# Patient Record
Sex: MALE | Race: WHITE | NOT HISPANIC OR LATINO | Employment: FULL TIME | URBAN - METROPOLITAN AREA
[De-identification: names, ages, dates, MRNs, and addresses within clinical notes are randomized per-mention and may not be internally consistent; named-entity substitution may affect disease eponyms.]

---

## 2017-04-24 ENCOUNTER — NON-PROVIDER VISIT (OUTPATIENT)
Dept: URGENT CARE | Facility: PHYSICIAN GROUP | Age: 62
End: 2017-04-24

## 2017-04-24 DIAGNOSIS — Z02.1 DRUG TESTING, PRE-EMPLOYMENT: ICD-10-CM

## 2017-04-24 PROCEDURE — 8898 PR URINE 11 PANEL - SEND TO LAB: Performed by: PHYSICIAN ASSISTANT

## 2017-06-12 ENCOUNTER — OFFICE VISIT (OUTPATIENT)
Dept: URGENT CARE | Facility: CLINIC | Age: 62
End: 2017-06-12

## 2017-06-12 VITALS
HEART RATE: 82 BPM | SYSTOLIC BLOOD PRESSURE: 132 MMHG | RESPIRATION RATE: 14 BRPM | WEIGHT: 168 LBS | BODY MASS INDEX: 27.99 KG/M2 | DIASTOLIC BLOOD PRESSURE: 74 MMHG | OXYGEN SATURATION: 96 % | HEIGHT: 65 IN | TEMPERATURE: 97.7 F

## 2017-06-12 DIAGNOSIS — K52.9 AGE (ACUTE GASTROENTERITIS): ICD-10-CM

## 2017-06-12 DIAGNOSIS — R10.13 EPIGASTRIC PAIN: ICD-10-CM

## 2017-06-12 PROCEDURE — 99202 OFFICE O/P NEW SF 15 MIN: CPT | Performed by: NURSE PRACTITIONER

## 2017-06-12 ASSESSMENT — ENCOUNTER SYMPTOMS
NAUSEA: 1
MYALGIAS: 0
DIAPHORESIS: 1
ABDOMINAL PAIN: 1
FEVER: 0
CHILLS: 1
DIARRHEA: 1
DIZZINESS: 1
COUGH: 0
VOMITING: 0

## 2017-06-12 NOTE — MR AVS SNAPSHOT
"        Buzz Matt   2017 1:15 PM   Office Visit   MRN: [id number]    Department:  [facility]   Dept Phone:  [phone]    Description:  Male : 1955   Provider:  MANSI Tong           Reason for Visit     Dizziness pt states he started feeling dizzy with stomach pain, chills and feeling cold      Allergies as of 2017     No Known Allergies      You were diagnosed with     Epigastric pain   [283111]       AGE (acute gastroenteritis)   [320419]         Vital Signs     Blood Pressure Pulse Temperature Respirations Height Weight    132/74 mmHg 82 36.5 °C (97.7 °F) 14 1.651 m (5' 5\") 76.204 kg (168 lb)    Body Mass Index Oxygen Saturation Smoking Status             27.96 kg/m2 96% Never Smoker          Basic Information     Date Of Birth Sex Race Ethnicity Preferred Language    1955 Male White Non- English      Health Maintenance     Patient has no pending health maintenance at this time      Current Immunizations     No immunizations on file.      Below and/or attached are the medications your provider expects you to take. Review all of your home medications and newly ordered medications with your provider and/or pharmacist. Follow medication instructions as directed by your provider and/or pharmacist. Please keep your medication list with you and share with your provider. Update the information when medications are discontinued, doses are changed, or new medications (including over-the-counter products) are added; and carry medication information at all times in the event of emergency situations     Allergies:  No Known Allergies          Medications  Valid as of: 2017 -  2:33 PM    Generic Name Brand Name Tablet Size Instructions for use    .                 Medicines prescribed today were sent to:     [facility] - [place], [place] - [address]    [address] [address]    Phone: [phone] Fax: [phone]    Open 24 Hours?: No   "      Medication refill instructions:       If your prescription bottle indicates you have medication refills left, it is not necessary to call your provider’s office. Please contact your pharmacy and they will refill your medication.    If your prescription bottle indicates you do not have any refills left, you may request refills at any time through one of the following ways: The online [employer] system (except Urgent Care), by calling your provider’s office, or by asking your pharmacy to contact your provider’s office with a refill request. Medication refills are processed only during regular business hours and may not be available until the next business day. Your provider may request additional information or to have a follow-up visit with you prior to refilling your medication.   *Please Note: Medication refills are assigned a new Rx number when refilled electronically. Your pharmacy may indicate that no refills were authorized even though a new prescription for the same medication is available at the pharmacy. Please request the medicine by name with the pharmacy before contacting your provider for a refill.           [facility] Status: Patient Declined

## 2017-06-12 NOTE — PROGRESS NOTES
"Subjective:      Buzz Matt is a 62 y.o. male who presents with Dizziness            Dizziness  This is a new problem. The current episode started today. The problem occurs constantly. The problem has been unchanged. Associated symptoms include abdominal pain, chills, diaphoresis and nausea. Pertinent negatives include no chest pain, coughing, fever, myalgias or vomiting. He has tried nothing for the symptoms.   earlier pain level was 8/10, central. Still has appendix. Denies constipation but has had some diarrhea.  Pain was sharp, stabbing.    Review of Systems   Constitutional: Positive for chills and diaphoresis. Negative for fever.   Respiratory: Negative for cough.    Cardiovascular: Negative for chest pain.   Gastrointestinal: Positive for nausea, abdominal pain and diarrhea. Negative for vomiting.   Musculoskeletal: Negative for myalgias.   Neurological: Positive for dizziness.          Objective:     /74 mmHg  Pulse 82  Temp(Src) 36.5 °C (97.7 °F)  Resp 14  Ht 1.651 m (5' 5\")  Wt 76.204 kg (168 lb)  BMI 27.96 kg/m2  SpO2 96%     Physical Exam   Constitutional: He is oriented to person, place, and time. He appears well-developed and well-nourished. No distress.   Cardiovascular: Normal rate, regular rhythm and normal heart sounds.    No murmur heard.  Pulmonary/Chest: Effort normal and breath sounds normal.   Abdominal: Soft. Bowel sounds are increased. There is no tenderness.   Musculoskeletal: Normal range of motion.   Neurological: He is alert and oriented to person, place, and time.   Skin: Skin is warm and dry.   Nursing note and vitals reviewed.              Assessment/Plan:     1. Epigastric pain  EKG - Clinic Performed   2. AGE (acute gastroenteritis)       EKG, NSR with rate of 74; poor R wave progressions and non specific anterolateral T wave abnormalities.  Likely viral but strict ED precautions given for worsening of symptoms.        "

## 2017-06-18 ENCOUNTER — OFFICE VISIT (OUTPATIENT)
Dept: URGENT CARE | Facility: PHYSICIAN GROUP | Age: 62
End: 2017-06-18
Payer: COMMERCIAL

## 2017-06-18 VITALS
DIASTOLIC BLOOD PRESSURE: 92 MMHG | OXYGEN SATURATION: 96 % | TEMPERATURE: 99.3 F | HEART RATE: 89 BPM | SYSTOLIC BLOOD PRESSURE: 144 MMHG | WEIGHT: 168 LBS | RESPIRATION RATE: 16 BRPM | BODY MASS INDEX: 27.96 KG/M2

## 2017-06-18 DIAGNOSIS — J22 ACUTE LOWER RESPIRATORY TRACT INFECTION: ICD-10-CM

## 2017-06-18 DIAGNOSIS — R05.8 PRODUCTIVE COUGH: ICD-10-CM

## 2017-06-18 PROCEDURE — 99214 OFFICE O/P EST MOD 30 MIN: CPT | Performed by: NURSE PRACTITIONER

## 2017-06-18 RX ORDER — BENZONATATE 100 MG/1
100 CAPSULE ORAL 3 TIMES DAILY PRN
Qty: 60 CAP | Refills: 0 | Status: SHIPPED | OUTPATIENT
Start: 2017-06-18 | End: 2018-03-16

## 2017-06-18 RX ORDER — CODEINE PHOSPHATE AND GUAIFENESIN 10; 100 MG/5ML; MG/5ML
5 SOLUTION ORAL
Qty: 120 ML | Refills: 0 | Status: SHIPPED | OUTPATIENT
Start: 2017-06-18 | End: 2017-06-18 | Stop reason: SDUPTHER

## 2017-06-18 RX ORDER — DOXYCYCLINE HYCLATE 100 MG
100 TABLET ORAL 2 TIMES DAILY
Qty: 14 TAB | Refills: 0 | Status: SHIPPED | OUTPATIENT
Start: 2017-06-18 | End: 2017-06-25

## 2017-06-18 RX ORDER — CODEINE PHOSPHATE AND GUAIFENESIN 10; 100 MG/5ML; MG/5ML
5 SOLUTION ORAL
Qty: 120 ML | Refills: 0 | Status: SHIPPED | OUTPATIENT
Start: 2017-06-18 | End: 2018-03-16

## 2017-06-19 NOTE — PROGRESS NOTES
Chief Complaint   Patient presents with   • URI     cough, body aches , green yellow phlegm       HISTORY OF PRESENT ILLNESS: Patient is a 62 y.o. male who presents today due to symptoms that started one month ago. Pt reports a productive cough without blood in sputum. Reports associated sore throat, right ear pain, nasal congestion, sinus pressure, fever, wheezing, and body aches. Denies chest pain, shortness of breath. Denies h/o asthma/copd/CAP. No immunocompromise. Has tried OTC cold medications without significant relief of symptoms. No recent ABX use. No other aggravating or alleviating factors.     There are no active problems to display for this patient.      Allergies:Review of patient's allergies indicates no known allergies.    No current [facility]-ordered outpatient prescriptions on file.     No current [facility]-ordered facility-administered medications on file.       History reviewed. No pertinent past medical history.    Social History   Substance Use Topics   • Smoking status: Never Smoker    • Smokeless tobacco: None   • Alcohol Use: None       No family status information on file.   History reviewed. No pertinent family history.    ROS:  Review of Systems   Constitutional: Positive for subjective fever, chills, fatigue. Negative for weight loss and malaise.  HENT: Positive for ear pain, congestion and sore throat. Negative for nosebleeds, and neck pain.    Eyes: Negative for vision changes.   Cardiovascular: Negative for chest pain, palpitations, orthopnea and leg swelling.   Respiratory: Positive for cough and sputum production. Negative for shortness of breath and wheezing.   Gastrointestinal: Negative for abdominal pain, nausea, vomiting or diarrhea.   Skin: Negative for rash, diaphoresis.     Exam:  Blood pressure 144/92, pulse 89, temperature 37.4 °C (99.3 °F), resp. rate 16, weight 76.204 kg (168 lb), SpO2 96 %.  General: well-nourished, well-developed male in NAD  Head: normocephalic,  atraumatic  Eyes: PERRLA, EOM within normal limits, no conjunctival injection, no scleral icterus, visual fields and acuity grossly intact.  Ears: normal shape and symmetry, no tenderness, no discharge. External canals are without any significant edema or erythema. Tympanic membranes are without any inflammation, no effusion. Gross auditory acuity is intact.  Nose: symmetrical without tenderness, mild discharge, erythema present bilateral nares.  Mouth/Throat: reasonable hygiene, no exudates or tonsillar enlargement. Erythema present.   Neck: no masses, range of motion within normal limits, no tracheal deviation.  Lymph: mild cervical adenopathy. No supraclavicular adenopathy.   Neuro: alert and oriented. Cranial nerves 1-12 grossly intact.   Cardiovascular: regular rate and rhythm without murmurs, rubs, or gallops. No edema.   Pulmonary: no distress. Chest is symmetrical with respiration, no wheezes, crackles, or rhonchi.   Musculoskeletal: appropriate muscle tone, gait is stable.  Skin: warm, dry, intact, no clubbing, no cyanosis.   Psych: appropriate mood, affect, judgement.         Assessment/Plan:  1. Acute lower respiratory tract infection  doxycycline (VIBRAMYCIN) 100 MG Tab   2. Productive cough  benzonatate (TESSALON) 100 MG Cap    guaifenesin-codeine (ROBITUSSIN AC) Solution oral solution    DISCONTINUED: guaifenesin-codeine (ROBITUSSIN AC) Solution oral solution           Doxy as directed. Tessalon and Robitussin AC for cough, sedative effects of medication discussed with patient.   Rest, increase fluids, hand and respiratory hygiene. May take OTC medications as directed for symptom relief. Honey for cough.   Supportive care, differential diagnoses, and indications for immediate follow-up discussed with patient.   Pathogenesis of diagnosis discussed including typical length and natural progression.  Instructed to return to clinic or nearest emergency department for any change in condition, further  concerns, or worsening of symptoms.  Patient states understanding of the plan of care and discharge instructions.  Instructed to make an appointment with their primary care provider in the next 3-7 days if not significantly improving and for further care.         Please note that this dictation was created using voice recognition software. I have made every reasonable attempt to correct obvious errors, but I expect that there are errors of grammar and possibly content that I did not discover before finalizing the note.      ABHIJEET Moseley.

## 2017-07-29 ENCOUNTER — OFFICE VISIT (OUTPATIENT)
Dept: URGENT CARE | Facility: PHYSICIAN GROUP | Age: 62
End: 2017-07-29
Payer: COMMERCIAL

## 2017-07-29 VITALS
DIASTOLIC BLOOD PRESSURE: 78 MMHG | TEMPERATURE: 97.7 F | HEART RATE: 56 BPM | WEIGHT: 165 LBS | OXYGEN SATURATION: 97 % | HEIGHT: 69 IN | BODY MASS INDEX: 24.44 KG/M2 | RESPIRATION RATE: 16 BRPM | SYSTOLIC BLOOD PRESSURE: 112 MMHG

## 2017-07-29 DIAGNOSIS — H05.012 ORBITAL CELLULITIS ON LEFT: ICD-10-CM

## 2017-07-29 DIAGNOSIS — H10.32 ACUTE BACTERIAL CONJUNCTIVITIS OF LEFT EYE: ICD-10-CM

## 2017-07-29 PROCEDURE — 99214 OFFICE O/P EST MOD 30 MIN: CPT | Performed by: NURSE PRACTITIONER

## 2017-07-29 RX ORDER — AMOXICILLIN AND CLAVULANATE POTASSIUM 875; 125 MG/1; MG/1
1 TABLET, FILM COATED ORAL 2 TIMES DAILY
Qty: 14 TAB | Refills: 0 | Status: SHIPPED | OUTPATIENT
Start: 2017-07-29 | End: 2017-08-05

## 2017-07-29 RX ORDER — TOBRAMYCIN 3 MG/ML
1 SOLUTION/ DROPS OPHTHALMIC 4 TIMES DAILY
Qty: 1 BOTTLE | Refills: 0 | Status: SHIPPED | OUTPATIENT
Start: 2017-07-29 | End: 2018-03-16

## 2017-07-29 ASSESSMENT — ENCOUNTER SYMPTOMS
NECK PAIN: 0
BLURRED VISION: 0
COUGH: 0
EYE REDNESS: 1
PHOTOPHOBIA: 0
SORE THROAT: 0
EYE DISCHARGE: 1
DOUBLE VISION: 0
CHILLS: 0
FEVER: 0
WEAKNESS: 0
TINGLING: 0
SENSORY CHANGE: 0
DIZZINESS: 0
MYALGIAS: 0
HEADACHES: 0
EYE PAIN: 0

## 2017-07-29 ASSESSMENT — VISUAL ACUITY
OD_CC: 20/40
OS_CC: 20/50

## 2017-07-29 NOTE — PROGRESS NOTES
"Subjective:      Buzz Denis is a 62 y.o. male who presents with Eye Problem            Eye Problem   Associated symptoms include an eye discharge and eye redness. Pertinent negatives include no blurred vision, double vision, fever, itching, photophobia, tingling or weakness.   Buzz is a 62 year old male who is here for left eye swelling started today. States woke up with left eye swollen shut with white d/c. States wife washed his eye out and took what looked like an \"eyelash\". Denies eye pain, vision change, wears prescription glasses, no contact lens. Denies nasal congestion, sore throat, ear/facial pressure, no h/o seasonal allergies. States had gotten \"2 small bals of nasal mucus out of my left nose this morning\".     PMH:  has a past medical history of Hypertension and Brain tumor (benign) (CMS-HCC).  MEDS:   Current outpatient prescriptions:   •  tobramycin (TOBREX) 0.3 % Solution ophthalmic solution, Place 1 Drop in left eye 4 times a day., Disp: 1 Bottle, Rfl: 0  •  amoxicillin-clavulanate (AUGMENTIN) 875-125 MG Tab, Take 1 Tab by mouth 2 times a day for 7 days., Disp: 14 Tab, Rfl: 0  •  LISINOPRIL PO, Take  by mouth., Disp: , Rfl:   •  Nebivolol HCl (BYSTOLIC PO), Take  by mouth., Disp: , Rfl:   •  benzonatate (TESSALON) 100 MG Cap, Take 1 Cap by mouth 3 times a day as needed for Cough., Disp: 60 Cap, Rfl: 0  •  guaifenesin-codeine (ROBITUSSIN AC) Solution oral solution, Take 5 mL by mouth at bedtime as needed., Disp: 120 mL, Rfl: 0  ALLERGIES: No Known Allergies  SURGHX:   Past Surgical History   Procedure Laterality Date   • Craniotomy       SOCHX:  reports that he has never smoked. He has never used smokeless tobacco. He reports that he does not drink alcohol or use illicit drugs.  FH: Family history was reviewed, no pertinent findings to report      Review of Systems   Constitutional: Negative for fever, chills and malaise/fatigue.   HENT: Negative for congestion, ear pain and sore throat. " "   Eyes: Positive for discharge and redness. Negative for blurred vision, double vision, photophobia and pain.   Respiratory: Negative for cough.    Musculoskeletal: Negative for myalgias and neck pain.   Skin: Negative for itching and rash.   Neurological: Negative for dizziness, tingling, sensory change, weakness and headaches.   Endo/Heme/Allergies: Negative for environmental allergies.   All other systems reviewed and are negative.         Objective:     /78 mmHg  Pulse 56  Temp(Src) 36.5 °C (97.7 °F)  Resp 16  Ht 1.753 m (5' 9.02\")  Wt 74.844 kg (165 lb)  BMI 24.36 kg/m2  SpO2 97%     Physical Exam   Constitutional: He is oriented to person, place, and time. Vital signs are normal. He appears well-developed and well-nourished. He is active and cooperative.  Non-toxic appearance. He does not have a sickly appearance. He does not appear ill. No distress.   HENT:   Head: Normocephalic.   Right Ear: Hearing, tympanic membrane, external ear and ear canal normal.   Left Ear: Hearing, tympanic membrane, external ear and ear canal normal.   Nose: Nose normal. No mucosal edema, rhinorrhea or sinus tenderness.   Mouth/Throat: Uvula is midline, oropharynx is clear and moist and mucous membranes are normal. No uvula swelling.   bilat hearing aids   Eyes: EOM are normal. Pupils are equal, round, and reactive to light. Right eye exhibits no chemosis, no discharge, no exudate and no hordeolum. No foreign body present in the right eye. Left eye exhibits chemosis and discharge. Left eye exhibits no exudate and no hordeolum. No foreign body present in the left eye. Right conjunctiva is not injected. Right conjunctiva has no hemorrhage. Left conjunctiva is injected. Left conjunctiva has no hemorrhage. No scleral icterus.       Periorbital swelling or left eye with redness of conjunctiva of upper and lower eyelids, white thick eye d/c under upper eyelid. Procedure:   Eye stain. Applied one drop Proparacaine to right " eye. Apllied Fluorescein stain to left eye. No uptake seen with lamp. No foreign body seen in left eye.          Neck: Normal range of motion. Neck supple.   Cardiovascular: Normal rate.    Pulmonary/Chest: Effort normal.   Musculoskeletal: Normal range of motion.   Neurological: He is alert and oriented to person, place, and time.   Skin: Skin is warm and dry. He is not diaphoretic.   Vitals reviewed.              Assessment/Plan:     1. Acute bacterial conjunctivitis of left eye  *  - tobramycin (TOBREX) 0.3 % Solution ophthalmic solution; Place 1 Drop in left eye 4 times a day.  Dispense: 1 Bottle; Refill: 0    2. Orbital cellulitis on left    - amoxicillin-clavulanate (AUGMENTIN) 875-125 MG Tab; Take 1 Tab by mouth 2 times a day for 7 days.  Dispense: 14 Tab; Refill: 0    May use longer acting antihistamine like Claritin prn for any seasonal allergy symptoms  May use cool compresses for eye swelling  Avoid touching eyes  May clean eyes with mild dilute soap along eyelash line with eyes closed, rinse with plenty of water  Monitor for increase in redness or swelling, vision change, eye pain in next 2 days- need re-evaluation

## 2017-07-29 NOTE — MR AVS SNAPSHOT
"        Buzz Denis   2017 2:10 PM   Office Visit   MRN: [id number]    Department:  [facility]   Dept Phone:  [phone]    Description:  Male : 1955   Provider:  ANDREAS Spencer           Reason for Visit     Eye Problem           Allergies as of 2017     No Known Allergies      You were diagnosed with     Acute bacterial conjunctivitis of left eye   [3109099]       Orbital cellulitis on left   [[id number]]         Vital Signs     Blood Pressure Pulse Temperature Respirations Height Weight    112/78 mmHg 56 36.5 °C (97.7 °F) 16 1.753 m (5' 9.02\") 74.844 kg (165 lb)    Body Mass Index Oxygen Saturation Smoking Status             24.36 kg/m2 97% Never Smoker          Basic Information     Date Of Birth Sex Race Ethnicity Preferred Language    1955 Male White Non- English      Health Maintenance        Date Due Completion Dates    IMM DTaP/Tdap/Td Vaccine (1 - Tdap) 1972 ---    COLONOSCOPY 2003 ---    IMM ZOSTER VACCINE 2013 ---    IMM INFLUENZA (1) 2017 ---            Current Immunizations     No immunizations on file.      Below and/or attached are the medications your provider expects you to take. Review all of your home medications and newly ordered medications with your provider and/or pharmacist. Follow medication instructions as directed by your provider and/or pharmacist. Please keep your medication list with you and share with your provider. Update the information when medications are discontinued, doses are changed, or new medications (including over-the-counter products) are added; and carry medication information at all times in the event of emergency situations     Allergies:  No Known Allergies          Medications  Valid as of: 2017 -  4:34 PM    Generic Name Brand Name Tablet Size Instructions for use    Amoxicillin-Pot Clavulanate (Tab) AUGMENTIN 875-125 MG Take 1 Tab by mouth 2 times a day for 7 days.        Benzonatate " (Cap) TESSALON 100 MG Take 1 Cap by mouth 3 times a day as needed for Cough.        Guaifenesin-Codeine (Solution) ROBITUSSIN -10 mg/5mL Take 5 mL by mouth at bedtime as needed.        Lisinopril   Take  by mouth.        Nebivolol HCl   Take  by mouth.        Tobramycin (Solution) TOBREX 0.3 % Place 1 Drop in left eye 4 times a day.        .                 Medicines prescribed today were sent to:     [facility], [place] - [address] [facility].    [address] [place] [address]    Phone: [phone] Fax: [phone]    Open 24 Hours?: No      Medication refill instructions:       If your prescription bottle indicates you have medication refills left, it is not necessary to call your provider’s office. Please contact your pharmacy and they will refill your medication.    If your prescription bottle indicates you do not have any refills left, you may request refills at any time through one of the following ways: The online [employer] system (except Urgent Care), by calling your provider’s office, or by asking your pharmacy to contact your provider’s office with a refill request. Medication refills are processed only during regular business hours and may not be available until the next business day. Your provider may request additional information or to have a follow-up visit with you prior to refilling your medication.   *Please Note: Medication refills are assigned a new Rx number when refilled electronically. Your pharmacy may indicate that no refills were authorized even though a new prescription for the same medication is available at the pharmacy. Please request the medicine by name with the pharmacy before contacting your provider for a refill.           [employer] Status: Patient Declined

## 2018-03-16 ENCOUNTER — OFFICE VISIT (OUTPATIENT)
Dept: INTERNAL MEDICINE | Facility: MEDICAL CENTER | Age: 63
End: 2018-03-16
Payer: COMMERCIAL

## 2018-03-16 VITALS
TEMPERATURE: 97.4 F | OXYGEN SATURATION: 95 % | DIASTOLIC BLOOD PRESSURE: 88 MMHG | SYSTOLIC BLOOD PRESSURE: 136 MMHG | RESPIRATION RATE: 19 BRPM | HEART RATE: 60 BPM | HEIGHT: 69 IN | BODY MASS INDEX: 27.05 KG/M2 | WEIGHT: 182.6 LBS

## 2018-03-16 DIAGNOSIS — Z86.018 HISTORY OF PITUITARY ADENOMA: ICD-10-CM

## 2018-03-16 DIAGNOSIS — H91.93 DECREASED HEARING OF BOTH EARS: ICD-10-CM

## 2018-03-16 DIAGNOSIS — Z12.12 SCREENING FOR COLORECTAL CANCER: ICD-10-CM

## 2018-03-16 DIAGNOSIS — Z12.11 SCREENING FOR COLORECTAL CANCER: ICD-10-CM

## 2018-03-16 DIAGNOSIS — I10 HYPERTENSION, UNSPECIFIED TYPE: ICD-10-CM

## 2018-03-16 DIAGNOSIS — Z87.892 HISTORY OF ANAPHYLAXIS: ICD-10-CM

## 2018-03-16 DIAGNOSIS — Z00.00 HEALTH CARE MAINTENANCE: ICD-10-CM

## 2018-03-16 PROCEDURE — 99204 OFFICE O/P NEW MOD 45 MIN: CPT | Mod: GC | Performed by: INTERNAL MEDICINE

## 2018-03-16 RX ORDER — EPINEPHRINE 0.3 MG/.3ML
0.3 INJECTION INTRAMUSCULAR ONCE
Qty: 0.3 ML | Refills: 0 | Status: SHIPPED
Start: 2018-03-16 | End: 2018-03-16

## 2018-03-16 ASSESSMENT — ACTIVITIES OF DAILY LIVING (ADL): BATHING_REQUIRES_ASSISTANCE: 0

## 2018-03-16 ASSESSMENT — PATIENT HEALTH QUESTIONNAIRE - PHQ9: CLINICAL INTERPRETATION OF PHQ2 SCORE: 0

## 2018-03-16 ASSESSMENT — PAIN SCALES - GENERAL: PAINLEVEL: NO PAIN

## 2018-03-16 NOTE — PATIENT INSTRUCTIONS
"- please  the prescription   - please follow up referral to ENT and GI   - please sign the medical records release form     Anaphylactic Reaction  An anaphylactic reaction (anaphylaxis) is a sudden allergic reaction that is very bad (severe). It also affects more than one part of the body. This condition can be life-threatening. If you have an anaphylactic reaction, you need to get medical help right away. You may need to stay in the hospital.  Your doctor may teach you how to use an allergy kit (anaphylaxis kit) and how to give yourself an allergy shot (epinephrine injection). You can give yourself an allergy shot with what is commonly called an auto-injector \"pen.\"  Symptoms of an anaphylactic reaction may include:  · A stuffy nose (nasal congestion).  · Headache.  · Tingling in your mouth.  · A flushed face.  · An itchy, red rash.  · Swelling of your eyes, lips, face, or tongue.  · Swelling of the back of your mouth and your throat.  · Breathing loudly (wheezing).  · A hoarse voice.  · Itchy, red, swollen areas of skin (hives).  · Dizziness or light-headedness.  · Passing out (fainting).  · Feeling worried or nervous (anxiety).  · Feeling confused.  · Pain in your belly (abdomen) or chest.  · Trouble with breathing, talking, or swallowing.  · A tight feeling in your chest or throat.  · Fast or uneven heartbeats (palpitations).  · Throwing up (vomiting).  · Watery poop (diarrhea).  Follow these instructions at home:  Safety  · Always keep an auto-injector pen or your allergy kit with you. These could save your life. Use them as told by your doctor.  · Do not drive until your doctor says that it is safe.  · Make sure that you, the people who live with you, and your employer know:  ¨ How to use your allergy kit.  ¨ How to use an auto-injector pen to give you an allergy shot.  · If you used your auto-injector pen:  ¨ Get more medicine for it right away. This is important in case you have another " reaction.  ¨ Get help right away.  · Wear a bracelet or necklace that says you have an allergy, if your doctor tells you to do this.  · Learn the signs of a very bad allergic reaction.  · Work with your doctors to make a plan for what to do if you have a very bad allergic reaction. Being prepared is important.  General instructions  · Take over-the-counter and prescription medicines only as told by your doctor.  · If you have itchy, red, swollen areas of skin or a rash:  ¨ Use over-the-counter medicine (antihistamine) as told by your doctor.  ¨ Put cold, wet cloths (cold compresses) on your skin.  ¨ Take baths or showers in cool water. Avoid hot water.  · If you had tests done, it is up to you to get your test results. Ask your doctor when your results will be ready.  · Tell any doctors who care for you that you have an allergy.  · Keep all follow-up visits as told by your doctor. This is important.  How is this prevented?  · Avoid things (allergens) that gave you a very bad allergic reaction before.  · If you have a food allergy and you go to a restaurant, tell your  about your allergy. If you are not sure if your meal was made with food that you are allergic to, ask your  before you eat it.  Contact a doctor if:  · You have symptoms of an allergic reaction. You may notice them soon after being around whatever it is that you are allergic to. Symptoms may include:  ¨ A rash.  ¨ A headache.  ¨ Sneezing or a runny nose.  ¨ Swelling.  ¨ Feeling sick to your stomach.  ¨ Watery poop.  Get help right away if:  · You had to use your auto-injector pen. You must go to the emergency room even if the medicine seems to be working.  · You have any of these:  ¨ A tight feeling in your chest or your throat.  ¨ Loud breathing.  ¨ Trouble with breathing.  ¨ Itchy, red, swollen areas of skin.  ¨ Red skin or itching all over your body.  ¨ Swelling in your lips, tongue, or the back of your throat.  · You have throwing up  "that gets very bad.  · You have watery poop that gets very bad.  · You pass out or feel like you might pass out.  These symptoms may be an emergency. Do not wait to see if the symptoms will go away. Use your auto-injector pen or allergy kit as you have been told. Get medical help right away. Call your local emergency services (911 in the [place] Do not drive yourself to the hospital.   Summary  · An anaphylactic reaction (anaphylaxis) is a sudden allergic reaction that is very bad (severe).  · This condition can be life-threatening. If you have an anaphylactic reaction, you need to get medical help right away.  · Your doctor may teach you how to use an allergy kit (anaphylaxis kit) and how to give yourself an allergy shot (epinephrine injection) with an auto-injector \"pen.\"  · Always keep an auto-injector pen or your allergy kit with you. These could save your life. Use them as told by your doctor.  · If you had to use your auto-injector pen, you must go to the emergency room even if the medicine seems to be working.  This information is not intended to replace advice given to you by your health care provider. Make sure you discuss any questions you have with your health care provider.  Document Released: 06/05/2009 Document Revised: 08/11/2017 Document Reviewed: 08/11/2017  [employer] Interactive Patient Education © 2017 [employer]    "

## 2018-03-16 NOTE — PROGRESS NOTES
New Patient to Establish    Reason to establish: New patient to establish    CC: New patient establishment, referral for hearing aids and EpiPen refill    HPI: Buzz Denis is a 62 y.o. male with medical history of hearing loss, anaphylaxis, pituitary adenoma status post transphenoidal resection and hypertension presented to the clinic today to establish care as a new patient.    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    History of pituitary adenoma: He has history of pituitary tumor, had a surgery in [place] befor moving to [place], it was incomplete resection, he spent 7 days in the ICU. 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        Patient Active Problem List    Diagnosis Date Noted   • History of anaphylaxis 03/16/2018   • Decreased hearing of both ears 03/16/2018   • History of pituitary adenoma 03/16/2018   • Hypertension 03/16/2018       Past Medical History:   Diagnosis Date   • Brain tumor (benign) (CMS-HCC)    • Hypertension        Current Outpatient Prescriptions   Medication Sig Dispense Refill   • EPIPEN 2-TRAVIS 0.3 MG/0.3ML Solution Auto-injector solution for injection 0.3 mL by Intramuscular route Once for 1 dose. 0.3 mL 0   • Nebivolol HCl (BYSTOLIC PO) Take  by mouth.       No current facility-administered medications for this visit.        Allergies as of 03/16/2018   • (No Known  "Allergies)       Social History     Social History   • Marital status:      Spouse name: N/A   • Number of children: N/A   • Years of education: N/A     Occupational History   • Not on file.     Social History Main Topics   • Smoking status: Never Smoker   • Smokeless tobacco: Never Used   • Alcohol use No   • Drug use: No   • Sexual activity: Not on file     Other Topics Concern   • Not on file     Social History Narrative   • No narrative on file       Family History   Problem Relation Age of Onset   • Diabetes Mother    • Hypertension Mother    • Lung Disease Father        Past Surgical History:   Procedure Laterality Date   • CRANIOTOMY         ROS: As per HPI. Additional pertinent symptoms as noted below.  Review of Systems   Constitutional: Negative for fever, chills, weight loss, malaise/fatigue and diaphoresis.   HENT: Negative for ear discharge, ear pain and tinnitus.    Eyes: Negative for blurred vision, double vision, pain, discharge and redness.   Respiratory: Negative for cough, hemoptysis, sputum production, shortness of breath and wheezing.    Cardiovascular: Negative for chest pain, palpitations, orthopnea, leg swelling and PND.   Gastrointestinal: Negative for heartburn, nausea, vomiting, abdominal pain, diarrhea, constipation and blood in stool.   Genitourinary: Negative for dysuria, urgency, frequency, hematuria and flank pain.   Musculoskeletal: Negative for myalgias, back pain, joint pain and neck pain.   Skin: Negative for itching and rash.   Neurological: Negative for dizziness, tingling, tremors, sensory change, focal weakness, loss of consciousness, weakness and headaches.   Psychiatric/Behavioral: Negative for depression and suicidal ideas. The patient is not nervous/anxious and does not have insomnia.        /88 Comment: recheck 5 minutes  Pulse 60   Temp 36.3 °C (97.4 °F)   Resp 19   Ht 1.753 m (5' 9.02\")   Wt 82.8 kg (182 lb 9.6 oz)   SpO2 95%   BMI 26.95 kg/m² "   Physical Exam   Constitutional: Oriented to person, place, and time and well-developed, well-nourished, and in no distress. Vital signs are normal.   HENT:   Head: Normocephalic and atraumatic.   Mouth/Throat: Oropharynx is clear and moist.   Eyes: Conjunctivae are normal.   Neck: Neck supple. No JVD present. No tracheal deviation present.   Cardiovascular: Normal rate.  Exam reveals no gallop and no friction rub.    No murmur heard.  Pulmonary/Chest: Effort normal and breath sounds normal. No respiratory distress. he has no wheezes. he has no rales. he exhibits no tenderness.   Abdominal: Soft. Bowel sounds are normal. he exhibits no mass. There is no tenderness. There is no rebound and no guarding.   Musculoskeletal: Normal range of motion. he exhibits no edema.   Lymphadenopathy:     he has no cervical adenopathy.   Neurological: he is alert and oriented to person, place, and time. he has normal strength. Gait normal. GCS score is 15.   Skin: No rash noted. No erythema. No pallor.     Assessment and Plan    1. Health care maintenance  - Colon cancer colonoscopy screening: ordered this visit, had colonoscopy about 10 years ago in [place] and it was normal   - Prostate cancer screening: Discussed with the patient, given no family history he does not want to do it.   - Lung cancer screening: Not indicated no history of smoking   - AAA screening: Not indicated no history of smoking   - Osteoporosis screening: Not recommended, no risk factor   - HIV screening: Patient does not want to do it   - flu vaccine: does not want to take it   - Tdap: 2 years ago   - PNA vac: due next year   - Basic Labs within the last 12 months: had one within a year was normal dose not want to do it now   - Vit D: does not want it   - Shingles vac:recommended to the patient  Requested a copy of the medical records from [place].      2. 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  Plan:  - Refill EpiPen  - Instructed patient to keep onset at home and another one at work    3. 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   Plan:  - Referral to ENT for hearing aids      4. History of pituitary adenoma  He has history of pituitary tumor, had a surgery in [place] before moving to [place], it was incomplete resection, he spent 7 days in the ICU. Denies headache and vision changes.  - Per patient his condition is stable and he was told that he does not need follow-up unless he has symptoms  Plan:  - Request copy of medical records          5. Hypertension, unspecified type  Was diagnosed with hypertension several years ago, was on lisinopril and told to years ago, his blood pressure returned to normal after diet, exercise and weight loss.  Plan:  - Monitor blood pressure  - Consider ordering urine microalbumin creatinine ratio on the next visit, patient did not want to do labs during this visit.    6. Screening for colorectal cancer  62 years old male recently moved from [place], had screening colonoscopy done about 10 years ago, per patient colonoscopy was normal.  Plan:  -Referral to GI for screening colonoscopy.        Followup: Return in about 6 months (around 9/16/2018).       Signed by: Catracho Patricia M.D.

## 2018-03-16 NOTE — LETTER
[facility]  Catracho Patricia M.D.  [address]  [place] [address]  Fax: [phone]   Authorization for Release/Disclosure of   Protected Health Information   Name: BUZZ DENIS : 1955 SSN: [id number]   Address: [address] [place] [address] [place] [place] [address] Phone:    [phone] (home)    I authorize the entity listed below to release/disclose the PHI below to:   [facility]/Catracho Patricia M.D. and Catracho Patricia M.D.   Provider or Entity Name:     Address   City, State, Zip   Phone:      Fax:     Reason for request: continuity of care   Information to be released:    [  ] LAST COLONOSCOPY,  including any PATH REPORT and follow-up  [  ] LAST FIT/COLOGUARD RESULT [  ] LAST DEXA  [  ] LAST MAMMOGRAM  [  ] LAST PAP  [  ] LAST LABS [  ] RETINA EXAM REPORT  [  ] IMMUNIZATION RECORDS  [  ] Release all info      [  ] Check here and initial the line next to each item to release ALL health information INCLUDING  _____ Care and treatment for drug and / or alcohol abuse  _____ HIV testing, infection status, or AIDS  _____ Genetic Testing    DATES OF SERVICE OR TIME PERIOD TO BE DISCLOSED: _____________  I understand and acknowledge that:  * This Authorization may be revoked at any time by you in writing, except if your health information has already been used or disclosed.  * Your health information that will be used or disclosed as a result of you signing this authorization could be re-disclosed by the recipient. If this occurs, your re-disclosed health information may no longer be protected by State or Federal laws.  * You may refuse to sign this Authorization. Your refusal will not affect your ability to obtain treatment.  * This Authorization becomes effective upon signing and will  on (date) __________.      If no date is indicated, this Authorization will  one (1) year from the signature date.    Name: Buzz Denis    Signature:   Date:     3/16/2018       PLEASE FAX REQUESTED RECORDS BACK TO: [phone]

## 2018-03-22 DIAGNOSIS — H91.93 DECREASED HEARING OF BOTH EARS: ICD-10-CM

## 2018-03-30 ENCOUNTER — TELEPHONE (OUTPATIENT)
Dept: INTERNAL MEDICINE | Facility: MEDICAL CENTER | Age: 63
End: 2018-03-30

## 2018-03-30 NOTE — TELEPHONE ENCOUNTER
1. Caller Name: Crow from pt's insurance company                      Call Back Number: [phone]    2. Message: Crow called and l/m.  The pt is requesting a referral for his Hearing Aids. Pt would need a referral for an Audiologist.     3. Patient approves office to leave a detailed voicemail/MyChart message: N\A    Called pt and l/m. Asking him to return our call.

## 2018-04-26 ENCOUNTER — TELEPHONE (OUTPATIENT)
Dept: INTERNAL MEDICINE | Facility: MEDICAL CENTER | Age: 63
End: 2018-04-26

## 2018-04-26 DIAGNOSIS — H91.93 HEARING DEFICIT, BILATERAL: ICD-10-CM

## 2018-04-26 NOTE — TELEPHONE ENCOUNTER
Pt wife Roxana called and spoke to me, she stated that they have not yet been able to get an appointment with the audiologist at [facility], the reason for this is due to the referral Dr. Patricia put in was wrong and pt not having a set PCP on his insurance. I called [facility] and spoke to Pat the audiologist she then stated that Dr will have to put in a new referral to Audiologist correctly, and pt himself would have to call his insurance to add Dr. Patricia as his PCP. Pt wife is hoping to get all this settled ASAP so she can then take her  in get this settled.

## 2018-04-30 NOTE — TELEPHONE ENCOUNTER
Called pt did not answer as well as his wife did not answer, L/M to give me a call back regarding referral.

## 2018-05-02 NOTE — TELEPHONE ENCOUNTER
Called patient and left a message stating I was checking on his insurance to see if that got fixed and on the referral to hearing doctor

## 2018-05-04 NOTE — TELEPHONE ENCOUNTER
I called the hearing facility Pt was seen already with hearing doctor office will call back if there is an issue

## 2021-03-03 DIAGNOSIS — Z23 NEED FOR VACCINATION: ICD-10-CM
